# Patient Record
Sex: FEMALE
[De-identification: names, ages, dates, MRNs, and addresses within clinical notes are randomized per-mention and may not be internally consistent; named-entity substitution may affect disease eponyms.]

---

## 2023-06-21 ENCOUNTER — APPOINTMENT (OUTPATIENT)
Dept: PAIN MANAGEMENT | Facility: CLINIC | Age: 43
End: 2023-06-21
Payer: COMMERCIAL

## 2023-06-21 VITALS
DIASTOLIC BLOOD PRESSURE: 62 MMHG | OXYGEN SATURATION: 100 % | HEART RATE: 85 BPM | BODY MASS INDEX: 28.22 KG/M2 | SYSTOLIC BLOOD PRESSURE: 91 MMHG | WEIGHT: 140 LBS | HEIGHT: 59 IN

## 2023-06-21 DIAGNOSIS — Z87.891 PERSONAL HISTORY OF NICOTINE DEPENDENCE: ICD-10-CM

## 2023-06-21 DIAGNOSIS — Z78.9 OTHER SPECIFIED HEALTH STATUS: ICD-10-CM

## 2023-06-21 PROBLEM — Z00.00 ENCOUNTER FOR PREVENTIVE HEALTH EXAMINATION: Status: ACTIVE | Noted: 2023-06-21

## 2023-06-21 PROCEDURE — 99203 OFFICE O/P NEW LOW 30 MIN: CPT

## 2023-06-21 RX ORDER — CYCLOBENZAPRINE HYDROCHLORIDE 5 MG/1
5 TABLET, FILM COATED ORAL 3 TIMES DAILY
Qty: 60 | Refills: 0 | Status: ACTIVE | COMMUNITY
Start: 2023-06-21 | End: 1900-01-01

## 2023-06-21 NOTE — REVIEW OF SYSTEMS
[Back Pain] : back pain [Joint Pain] : joint pain [Joint Stiffness] : joint stiffness [Decreased ROM] : decreased range of motion [Weakness] : weakness [Negative] : Heme/Lymph

## 2023-06-21 NOTE — ASSESSMENT
[FreeTextEntry1] : Ms. Sally Lachman is a 43 year F suffering from right sided low back pain and RIGHT leg pain, that based upon today's subjective complaints, physical examination, and chart review, is likely secondary to lumbar radiculoapthy\par \par >> Medications\par \par Chronic opioid use for non-malignant pain, in particular at high doses would not be recommended since it can potentially lead to hyperalgesia (hypersensitivity), tolerance and addiction. \par  \par Flexeril 5 mg po bid prn\par \par  \par >> Interventions\par  \par None indicated at this time\par  \par >> Therapy and Other Modalities\par  \par Continue with daily home stretching regimen\par \par >> Imaging and Other Studies\par \par MRI Lumbar Spine\par \par >> Consults\par \par None ordered

## 2023-06-21 NOTE — HISTORY OF PRESENT ILLNESS
[Back Pain] : back pain [___ wks] : [unfilled] week(s) ago [Paroxysmal] : paroxysmal [2] : a current pain level of 2/10 [5] : a minimum pain level of 5/10 [8] : a maximum pain level of 8/10 [Sharp] : sharp [Burning] : burning [Transitioning] : transitioning [Bending] : bending [Medications] : medications [Heat] : heat [Ice] : ice [FreeTextEntry1] : HPI - Ms. Sally Lachman is a 43 year F with PHx as below, referred by Dr Aaliyah Potts who presents today with chief complaint of RIGHT low back pain. Reports that it developed several weeks ago. It is located RIGHT lumbar region;  there is radiation of the pain into the right leg at times. The pain is presently 5/10 in severity on the numerical rating scale. It is sharp in nature. The pain is constant. There is diurnal worsening, during the course of the day. The pain is aggravated with activity The pain improves with rest. The pain is functionally and emotionally disabling for the patient as its preventing them from going about activities of daily living, such as routine housework, exercising, riding her bike. The pain does impair the patient’s ability to sleep. \par \par Patient was NOT  been seen by pain management in the past. \par Patient attests provider directed treatment, including a provider directed stretching regimen targeting the quadratus lumborum, physical therapy, ketorolac, cyclobenzaprine.\par Patient report that symptoms have been persistent and and progressing after treatment\par  \par Reports there is NO present numbness, there is NO weakness. Patient denies any bowel/bladder incontinence, no saddle/perineal anesthesia or any other red flag signs or symptoms. Reports regular BMs.\par   [FreeTextEntry2] : 21 [FreeTextEntry7] : Referred by PT gunnar Mo.  Patient presents with right side low back pain and spasms/cramps. [de-identified] : Cramps [FreeTextEntry3] : Morning, Squatting [FreeTextEntry4] : Weight bearing, Stretching

## 2023-06-21 NOTE — PHYSICAL EXAM
[de-identified] : General: Well-developed and well-nourished.  No acute distress.\par Psychiatric: Behavior was cooperative  \par Head:  Normocephalic and atraumatic\par Eyes:  Sclera white. Conjunctiva and eyelids pink and moist without discharge.\par Cardiovascular:  Regular\par Respiratory:  Trachea midline. Normal effort.\par No accessory muscle use with respiration\par Abdomen: Non distended, soft and nontender\par Skin:  No rashes, ulcers, or lesions appreciated. \par Back  There is pain with extension,   ROM limited by pain\par There is hypertonicity of the lumbar paraspinal muscles, quadratus muscle\par There is tenderness palpation at the right quadratus muscle\par There is pain upon Fabere test\par Positive seated straight leg raise\par Extremities: No edema \par Musculoskeletal: Moving all extremities freely \par Neuro: CN 2-12 Grossly intact\par Sensation to light touch is intact in all extremities\par Gait: Ambulates with no assistive device.

## 2023-07-11 ENCOUNTER — NON-APPOINTMENT (OUTPATIENT)
Age: 43
End: 2023-07-11

## 2023-07-12 ENCOUNTER — APPOINTMENT (OUTPATIENT)
Dept: PAIN MANAGEMENT | Facility: CLINIC | Age: 43
End: 2023-07-12
Payer: COMMERCIAL

## 2023-07-12 DIAGNOSIS — M47.816 SPONDYLOSIS W/OUT MYELOPATHY OR RADICULOPATHY, LUMBAR REGION: ICD-10-CM

## 2023-07-12 DIAGNOSIS — M79.10 MYALGIA, UNSPECIFIED SITE: ICD-10-CM

## 2023-07-12 DIAGNOSIS — M54.16 RADICULOPATHY, LUMBAR REGION: ICD-10-CM

## 2023-07-12 PROCEDURE — 99213 OFFICE O/P EST LOW 20 MIN: CPT | Mod: 95

## 2023-07-12 RX ORDER — MELOXICAM 7.5 MG/1
7.5 TABLET ORAL
Qty: 25 | Refills: 0 | Status: ACTIVE | COMMUNITY
Start: 2023-07-12 | End: 1900-01-01

## 2023-07-26 ENCOUNTER — TRANSCRIPTION ENCOUNTER (OUTPATIENT)
Age: 43
End: 2023-07-26

## 2023-08-16 NOTE — PHYSICAL EXAM
[de-identified] : Physical Exam (Telemedicine): \par Gen: AAOX3, NAD\par HEENT: PERRLA, EOMI, NCAT, NP\par Pulmonary: No Signs of Respiratory Distress\par MSK: AROM WFL, Limitations painful truncal ROM\par Neurological: Grossly neurologically intact, Noted deficits none noted\par Gait: Normal, Non-antalgic, Sans AD\par Derm: No Rash, (-)Lesions, (-)Erythema, (-)Cyanosis \par Psych: Calm, Cooperative, Conversational\par \par Disclaimer: This is a limited examination for the purposes of conducting a telemedicine visit during the COVID-19 pandemic. Physical exam maneuvers were modified as necessary to allow patient to self perform. A focused physician physical examination will be performed during in person visits and should be referred to to determine need for further testing, interventions or degree of disability.

## 2023-08-16 NOTE — ASSESSMENT
[FreeTextEntry1] : Ms. Sally Lachman is a 43 year F suffering from right sided low back pain and RIGHT leg pain, that based upon today's subjective complaints, physical examination, and chart review, is likely secondary to lumbar radiculoapthy\par \par >> Medications\par \par Chronic opioid use for non-malignant pain, in particular at high doses would not be recommended since it can potentially lead to hyperalgesia (hypersensitivity), tolerance and addiction. \par  \par Flexeril 5 mg po bid prn\par \par Trial course of Meloxicam 7.5 mg po daily prn\par  \par >> Interventions\par  \par Consider for RIGHT L4/5  interlaminar epidural steroid injection under fluoroscopic guidance. Success rate and possible complications discussed with patient in detail. \par  \par >> Therapy and Other Modalities\par  \par Continue with daily home stretching regimen\par \par >> Imaging and Other Studies\par \par I have personally reviewed the images in detail together with the patient today, and I have answered all questions regarding this condition to the best of my ability, to the patient's satisfaction. \par \par >> Consults\par \par None ordered

## 2023-08-16 NOTE — HISTORY OF PRESENT ILLNESS
[FreeTextEntry1] : Interval Note:  Since last visit the pain intensity has persisted right lumbar regoin   Medication has been allowing patient to go about activities of daily living with less pain.  Moving bowels regularly. No recent falls.   Patient denies any bowel/bladder incontinence, no saddle/perineal anesthesia or any other red flag signs or symptoms.   --   HPI - Ms. Sally Lachman is a 43 year F with PHx as below, referred by Dr Yvette Potts who presents today with chief complaint of RIGHT low back pain. Reports that it developed several weeks ago. It is located RIGHT lumbar region;  there is radiation of the pain into the right leg at times. The pain is presently 5/10 in severity on the numerical rating scale. It is sharp in nature. The pain is constant. There is diurnal worsening, during the course of the day. The pain is aggravated with activity The pain improves with rest. The pain is functionally and emotionally disabling for the patient as its preventing them from going about activities of daily living, such as routine housework, exercising, riding her bike. The pain does impair the patient's ability to sleep.   Patient was NOT  been seen by pain management in the past.  Patient attests provider directed treatment, including a provider directed stretching regimen targeting the quadratus lumborum, physical therapy, ketorolac, cyclobenzaprine. Patient report that symptoms have been persistent and and progressing after treatment   Reports there is NO present numbness, there is NO weakness. Patient denies any bowel/bladder incontinence, no saddle/perineal anesthesia or any other red flag signs or symptoms. Reports regular BMs.

## 2023-08-16 NOTE — DATA REVIEWED
[FreeTextEntry1] : MRI lumbar spine July 30, 2023\par Impression:\par Levoscoliosis\par Large segment of the distal thoracic spine included in the field-of-view with limited evaluation with multilevel abnormalities\par L1-L2 grade 1 retrolisthesis, there is a posterior disc bulge abutting the anterior thecal sac.  Facets are hypertrophic.\par L2-3 grade 1 retrolisthesis there is posterior disc bulge abutting the anterior thecal sac with left foraminal disc extension.  There is superimposed right foraminal disc herniation facets are hypertrophic with encroachment on the lateral recess with bilateral facet effusions.\par L3-4 annular disc bulge abutting the anterior thecal sac with left foraminal disc extension marginally abutting the exiting left L3 nerve root.  There is superimposed right foraminal disc herniation impinging on the exiting right L3 nerve root.  Facets are hypertrophic with impingement on the right lateral recess and encroachment on the posterior thecal sac along with ligamentum flavum hypertrophy.\par L4-5 grade 1 anterolisthesis.  There is annular disc bulge with foraminal disc extensions abutting the exiting L4 nerve roots.  Facets are hypertrophic with impingement on the lateral recess and with bilateral facet effusions.  There is subchondral cyst in the left facet.  There is central canal narrowing related to the above and ligamentum flavum hypertrophy.\par L5-S1 annular disc bulge with prominent anterior disc protrusion with left foraminal disc extension marginally abutting the exiting left L5 nerve root.  There is superimposed right foraminal disc herniation impinging on the exiting right L5 nerve root traversing proximal right S1 nerve root.  Shallow central canal.\par Edema in the L4 and L5 facets nonspecific and may be reactive/inflammatory related to arthropathic changes.  Also noted are findings consistent with inflammatory/noninfectious posterior paraspinal fasciitis.

## 2024-06-25 ENCOUNTER — APPOINTMENT (OUTPATIENT)
Dept: BARIATRICS/WEIGHT MGMT | Facility: CLINIC | Age: 44
End: 2024-06-25
Payer: COMMERCIAL

## 2024-06-25 VITALS
HEIGHT: 59 IN | BODY MASS INDEX: 30.67 KG/M2 | OXYGEN SATURATION: 100 % | DIASTOLIC BLOOD PRESSURE: 87 MMHG | SYSTOLIC BLOOD PRESSURE: 127 MMHG | HEART RATE: 65 BPM | WEIGHT: 152.13 LBS

## 2024-06-25 DIAGNOSIS — E66.9 OBESITY, UNSPECIFIED: ICD-10-CM

## 2024-06-25 PROCEDURE — G2211 COMPLEX E/M VISIT ADD ON: CPT

## 2024-06-25 PROCEDURE — 99205 OFFICE O/P NEW HI 60 MIN: CPT

## 2024-06-26 RX ORDER — TIRZEPATIDE 2.5 MG/.5ML
2.5 INJECTION, SOLUTION SUBCUTANEOUS
Qty: 2 | Refills: 0 | Status: COMPLETED | COMMUNITY
Start: 2024-06-25 | End: 2024-06-26

## 2024-07-01 ENCOUNTER — TRANSCRIPTION ENCOUNTER (OUTPATIENT)
Age: 44
End: 2024-07-01

## 2024-07-23 ENCOUNTER — APPOINTMENT (OUTPATIENT)
Dept: BARIATRICS/WEIGHT MGMT | Facility: CLINIC | Age: 44
End: 2024-07-23
Payer: COMMERCIAL

## 2024-07-23 VITALS
DIASTOLIC BLOOD PRESSURE: 79 MMHG | HEIGHT: 59 IN | WEIGHT: 153.38 LBS | OXYGEN SATURATION: 97 % | SYSTOLIC BLOOD PRESSURE: 113 MMHG | BODY MASS INDEX: 30.92 KG/M2 | HEART RATE: 75 BPM

## 2024-07-23 DIAGNOSIS — E66.9 OBESITY, UNSPECIFIED: ICD-10-CM

## 2024-07-23 PROCEDURE — G2211 COMPLEX E/M VISIT ADD ON: CPT

## 2024-07-23 PROCEDURE — 99214 OFFICE O/P EST MOD 30 MIN: CPT

## 2024-07-23 RX ORDER — PHENTERMINE HYDROCHLORIDE 15 MG/1
15 CAPSULE ORAL
Qty: 30 | Refills: 0 | Status: ACTIVE | COMMUNITY
Start: 2024-07-23 | End: 1900-01-01

## 2024-07-23 RX ORDER — TOPIRAMATE 25 MG/1
25 TABLET, FILM COATED ORAL
Qty: 30 | Refills: 0 | Status: COMPLETED | COMMUNITY
Start: 2024-07-01 | End: 2024-07-23

## 2024-07-23 NOTE — HISTORY OF PRESENT ILLNESS
[FreeTextEntry1] : Medical Hx: HLD, OA mostly pelvis and lower back, anxiety  Surgical hx: cholecystectomy, 2    PCP: Dr. Zak APODACA   Started struggling with weight most significantly in past yr. Some fluctuation throughout lifetime.  Past Wt Loss Attempts: Optivia, IF, physical activity, tracked intake  Highest Adult Wt: current  Lowest Adult Wt: 130 lbs   Medications: Tried Ozempic    Food Recall: B-Usually misses  Some food intolerance --fatty food, egg makes her gassy, or protein shake (if she has them daily) Loss of control with eating: No but is an emotional eater  Do you wake up at night to eat: had been doing this before, not now  Water Intake: Good   Exercise Regimen: Used to go 4-5 times per week; now 2 days; cardio and light strength to back issues Sleep: 5 hrs; wakes up in the middle of the night  Stress Level: Manageable  Gyn: Post-menopausal  Hot flashes, better now  Social Hx: Kids: 17 20, 22.      Labs: 3/6/24 A1C 5.1%  Total chol 247 HDL 70  Tri 81 TSH 0.82  Tanita: fat 37%, Muscle mass 91, Bone mass 4.8, MBI 30.7  24: Feels well. Has a lot of fatigue with Topamax and no change in appetite, therefore she stopped it. Working on optimizing her sleep, has been better. Plans to go on camping trip with her kids.

## 2024-07-31 ENCOUNTER — TRANSCRIPTION ENCOUNTER (OUTPATIENT)
Age: 44
End: 2024-07-31

## 2024-08-13 ENCOUNTER — TRANSCRIPTION ENCOUNTER (OUTPATIENT)
Age: 44
End: 2024-08-13

## 2024-09-10 ENCOUNTER — APPOINTMENT (OUTPATIENT)
Dept: BARIATRICS/WEIGHT MGMT | Facility: CLINIC | Age: 44
End: 2024-09-10
Payer: COMMERCIAL

## 2024-09-10 VITALS
WEIGHT: 152.25 LBS | DIASTOLIC BLOOD PRESSURE: 89 MMHG | SYSTOLIC BLOOD PRESSURE: 125 MMHG | BODY MASS INDEX: 30.75 KG/M2

## 2024-09-10 DIAGNOSIS — E66.9 OBESITY, UNSPECIFIED: ICD-10-CM

## 2024-09-10 PROCEDURE — 99213 OFFICE O/P EST LOW 20 MIN: CPT

## 2024-09-10 PROCEDURE — G2211 COMPLEX E/M VISIT ADD ON: CPT

## 2024-09-10 NOTE — HISTORY OF PRESENT ILLNESS
[FreeTextEntry1] : Medical Hx: HLD, OA mostly pelvis and lower back, anxiety  Surgical hx: cholecystectomy, 2    PCP: Dr. Zak APODACA   Started struggling with weight most significantly in past yr. Some fluctuation throughout lifetime.  Past Wt Loss Attempts: Optivia, IF, physical activity, tracked intake  Highest Adult Wt: current  Lowest Adult Wt: 130 lbs   Medications: Tried Ozempic    Food Recall: B-Usually misses  Some food intolerance --fatty food, egg makes her gassy, or protein shake (if she has them daily) Loss of control with eating: No but is an emotional eater  Do you wake up at night to eat: had been doing this before, not now  Water Intake: Good   Exercise Regimen: Used to go 4-5 times per week; now 2 days; cardio and light strength to back issues Sleep: 5 hrs; wakes up in the middle of the night  Stress Level: Manageable  Gyn: Post-menopausal  Hot flashes, better now  Social Hx: Kids: 17 20, 22.      Labs: 3/6/24 A1C 5.1%  Total chol 247 HDL 70  Tri 81 TSH 0.82  Tanita: fat 37%, Muscle mass 91, Bone mass 4.8, MBI 30.7  24: Feels well. Has a lot of fatigue with Topamax and no change in appetite, therefore she stopped it. Working on optimizing her sleep, has been better. Plans to go on camping trip with her kids.   9/10/24: Returned from 3 weeks of PTO. Ran out of Phentermine, felt well on it, more energy. Lost 4 lbs but went back up. Was given Omeprazole for acid reflux. Plans to have a Colonoscopy next month, possibly an upper endoscopy as well. Started reading, which she is enjoying. Using [employer] mika to track food, mostly staying within her points.

## 2024-10-02 ENCOUNTER — TRANSCRIPTION ENCOUNTER (OUTPATIENT)
Age: 44
End: 2024-10-02

## 2024-10-11 ENCOUNTER — TRANSCRIPTION ENCOUNTER (OUTPATIENT)
Age: 44
End: 2024-10-11